# Patient Record
Sex: MALE | Race: WHITE | NOT HISPANIC OR LATINO | URBAN - METROPOLITAN AREA
[De-identification: names, ages, dates, MRNs, and addresses within clinical notes are randomized per-mention and may not be internally consistent; named-entity substitution may affect disease eponyms.]

---

## 2023-11-22 ENCOUNTER — OFFICE (OUTPATIENT)
Dept: URBAN - METROPOLITAN AREA CLINIC 11 | Facility: CLINIC | Age: 83
End: 2023-11-22

## 2024-01-04 ENCOUNTER — OFFICE (OUTPATIENT)
Dept: URBAN - METROPOLITAN AREA CLINIC 11 | Facility: CLINIC | Age: 84
End: 2024-01-04

## 2024-03-12 ENCOUNTER — OFFICE (OUTPATIENT)
Dept: URBAN - METROPOLITAN AREA CLINIC 11 | Facility: CLINIC | Age: 84
End: 2024-03-12

## 2024-03-12 VITALS
OXYGEN SATURATION: 99 % | HEIGHT: 69 IN | SYSTOLIC BLOOD PRESSURE: 144 MMHG | DIASTOLIC BLOOD PRESSURE: 84 MMHG | HEART RATE: 66 BPM | WEIGHT: 142 LBS

## 2024-03-12 DIAGNOSIS — R10.84 GENERALIZED ABDOMINAL PAIN: ICD-10-CM

## 2024-03-12 DIAGNOSIS — R63.4 ABNORMAL WEIGHT LOSS: ICD-10-CM

## 2024-03-12 DIAGNOSIS — K59.00 CONSTIPATION, UNSPECIFIED: ICD-10-CM

## 2024-03-12 DIAGNOSIS — R11.0 NAUSEA: ICD-10-CM

## 2024-03-12 PROCEDURE — 99204 OFFICE O/P NEW MOD 45 MIN: CPT | Performed by: NURSE PRACTITIONER

## 2024-03-12 RX ORDER — OMEPRAZOLE 40 MG/1
40 CAPSULE, DELAYED RELEASE ORAL
Qty: 30 | Refills: 4 | Status: COMPLETED
Start: 2024-03-12 | End: 2024-05-15

## 2024-03-12 NOTE — SERVICEHPINOTES
Mr. Kiran is an 83 year old male here today with complaints of  abdominal pain, constipation, nausea.  He fell and broke his hip on 01/27/2024 and then went to rehab.  His wife states he refused to eat when he was in rehab and has lost 20-30 lb in the last 2 months. He has a decreased appetite but his wife states he has had a decreased appetite most of his life.  He has nausea and belching but denies any heartburn, reflux, vomiting.  He has constipation and has bowel movements every 4-5 days.  He will take a laxative at that time.  He is tried MiraLax in the past and this did work well for him.   He has a little abdominal discomfort secondary to the constipation but denies any severe pain. Prior to breaking his hip, he had bowel movements 3 times a week. He denies any blood in stool. He had a colonoscopy in 2021.

## 2024-03-12 NOTE — SERVICENOTES
he has had a 20-30 lb weight loss per patient but we have no documentation of this since he broke his hip on 01/27.  He was in rehab and his wife states he refused to eat which I suspect is the cause of his weight loss.  He has had a decreased appetite his whole life according to his wife.   Will get a CT scan to further evaluate he had a colonoscopy in 2021 and was told he would not need another.  Will request records.   He has had some nausea and belching.  Will try him on omeprazole.  Will also get an H pylori breath test as well.  Will see back in 2 months or sooner if needed

## 2024-03-21 ENCOUNTER — OFFICE (OUTPATIENT)
Dept: URBAN - METROPOLITAN AREA CLINIC 22 | Facility: CLINIC | Age: 84
End: 2024-03-21

## 2024-03-21 DIAGNOSIS — K59.00 CONSTIPATION, UNSPECIFIED: ICD-10-CM

## 2024-03-21 DIAGNOSIS — R11.0 NAUSEA: ICD-10-CM

## 2024-03-21 DIAGNOSIS — R63.4 ABNORMAL WEIGHT LOSS: ICD-10-CM

## 2024-03-21 DIAGNOSIS — R10.84 GENERALIZED ABDOMINAL PAIN: ICD-10-CM

## 2024-03-21 PROCEDURE — 74177 CT ABD & PELVIS W/CONTRAST: CPT | Mod: TC | Performed by: NURSE PRACTITIONER

## 2024-05-15 ENCOUNTER — OFFICE (OUTPATIENT)
Dept: URBAN - METROPOLITAN AREA CLINIC 11 | Facility: CLINIC | Age: 84
End: 2024-05-15

## 2024-05-15 VITALS
DIASTOLIC BLOOD PRESSURE: 50 MMHG | HEIGHT: 69 IN | WEIGHT: 140 LBS | HEART RATE: 58 BPM | OXYGEN SATURATION: 99 % | SYSTOLIC BLOOD PRESSURE: 126 MMHG

## 2024-05-15 DIAGNOSIS — R11.0 NAUSEA: ICD-10-CM

## 2024-05-15 DIAGNOSIS — R63.4 ABNORMAL WEIGHT LOSS: ICD-10-CM

## 2024-05-15 DIAGNOSIS — R10.84 GENERALIZED ABDOMINAL PAIN: ICD-10-CM

## 2024-05-15 DIAGNOSIS — K59.00 CONSTIPATION, UNSPECIFIED: ICD-10-CM

## 2024-05-15 PROCEDURE — 99214 OFFICE O/P EST MOD 30 MIN: CPT | Performed by: NURSE PRACTITIONER

## 2024-05-15 NOTE — SERVICEHPINOTES
Mr. Kiran is an 83 year old male here today with complaints of  abdominal pain, constipation, nausea.  He fell and broke his hip on 01/27/2024 and then went to rehab. His wife states he refused to eat when he was in rehab and has lost 20-30 lb in the last 2 months. He has a decreased appetite but his wife states he has had a decreased appetite most of his life.  He has nausea and belching but denies any heartburn, reflux, vomiting.  He has constipation and has bowel movements every 4-5 days.  He will take a laxative at that time. He is tried MiraLax in the past and this did work well for him.  He has a little abdominal discomfort secondary to the constipation but denies any severe pain. Prior to breaking his hip, he had bowel movements 3 times a week. He denies any blood in stool. He had a  normal colonoscopy by Dr. Pope on 11/26/2018.  He had a negative H pylori breath test on 03/12/2024.  He had a CT scan 03/21/2024 that showed bladder wall thickening and a small iliac aneurysm  but was otherwise unremarkable.  he was started on MiraLax daily for his constipation and omeprazole for his upper GI symptoms.
br
br  In follow up on 5/15/24,  he continues to have constipation but was only taking MiraLax 1 to  2 times a week.  He is having a bowel movement every  before days when he takes a laxative.   He does have a large bowel movement at that time.   He thinks he took the omeprazole that was prescribed at the last visit but is not currently taking the medication.  he currently is not having any upper GI symptoms.  He denies any nausea, vomiting, abdominal pain.  He continues drinking ensure his weight has been pretty stable.

## 2024-05-15 NOTE — SERVICENOTES
he continues to have constipation but was only taking MiraLax 1 to 2 times a week.  Discussed he needs to take MiraLax twice a day daily.  he denies any upper GI symptoms today.  Will see back in 3 months or sooner if needed